# Patient Record
(demographics unavailable — no encounter records)

---

## 2024-10-28 NOTE — PHYSICAL EXAM
[Chaperone Present] : A chaperone was present in the examining room during all aspects of the physical examination [[id number]] : A chaperone was present during the pelvic exam. [Appropriately responsive] : appropriately responsive [Alert] : alert [No Acute Distress] : no acute distress [Soft] : soft [Non-tender] : non-tender [Non-distended] : non-distended [No HSM] : No HSM [No Lesions] : no lesions [No Mass] : no mass [Oriented x3] : oriented x3 [Examination Of The Breasts] : a normal appearance [No Masses] : no breast masses were palpable [Labia Majora] : normal [Labia Minora] : normal [Normal] : normal [Uterine Adnexae] : normal [FreeTextEntry2] : Meño Cuellar [FreeTextEntry3] : thyroid fullness [FreeTextEntry9] : Guaiac negative. No masses noted.

## 2024-10-28 NOTE — PLAN
[FreeTextEntry1] : 54 year old female presents for routine gyn exam, thyroid fullness - No pertinent PMH. BSE taught Breast and pelvic exam performed Pap/HPV conducted  10/2023 mammogram and breast sonogram. Rx given, due now 2018 colonoscopy, due now - f/u w/ GI. Pt was not comfortable with cologuard.  Thyroid fullness: Pt to send thyroid sono report  Elevated /85: repeated and will also f-u with pmd Advised pt to monitor   RTO in 1 year or PRN      I, Meño Cuellar, am scribing for and in the presence of Dr. Bansal in the following sections: HISTORY OF PRESENT ILLNESS, PAST MEDICAL/FAMILY/SOCIAL HISTORY, REVIEW OF SYSTEMS, PHYSICAL EXAM, ASSESSMENT/PLAN.

## 2024-10-28 NOTE — HISTORY OF PRESENT ILLNESS
[Patient reported colonoscopy was normal] : Patient reported colonoscopy was normal [Previously active] : previously active [Men] : men [No] : No [FreeTextEntry1] : 10/28/2024. ROMY VENTURA 54 year old female  LMP 10/7/2024 presents for annual visit. No pertinent PMH.  She reports skipping menses, denies intermenstrual bleeding. She denies abn discharge or vaginitis sxs. No urinary complaints. She has normal BM, no bloody stool. She denies abdominal or pelvic pain.  She reports known thyroid fullness, s/p thyroid sono, possibly advised to repeat in 1yr. pmd is following up   OBHx:  GynHx: No Hx of STI, fibroids, ovarian cysts, abnl paps, or pelvic infections. PMH: denies SHx: T&A FHx: Denies FHx of breast, ovarian, uterine or colon cancer. Med: denies All: PNC (hives) Psych: PHQ9 = 0  kids senior and freshman in hs [Mammogramdate] : 10/2023 [TextBox_19] : BI-RADS 2, Repeat due now, Rx given. [BreastSonogramDate] : 10/2023 [TextBox_25] : BI-RADS 2, Repeat due now, Rx given. [PapSmeardate] : 10/2023 [TextBox_31] : NILM; HPV negative. Done today. [ColonoscopyDate] : 2018 [TextBox_43] : repeat due now